# Patient Record
Sex: MALE | Race: WHITE | Employment: UNEMPLOYED | URBAN - NONMETROPOLITAN AREA
[De-identification: names, ages, dates, MRNs, and addresses within clinical notes are randomized per-mention and may not be internally consistent; named-entity substitution may affect disease eponyms.]

---

## 2020-09-29 ENCOUNTER — HOSPITAL ENCOUNTER (EMERGENCY)
Age: 50
Discharge: HOME | End: 2020-09-30
Attending: FAMILY MEDICINE
Payer: COMMERCIAL

## 2020-09-29 ENCOUNTER — APPOINTMENT (OUTPATIENT)
Dept: GENERAL RADIOLOGY | Age: 50
End: 2020-09-29
Payer: COMMERCIAL

## 2020-09-29 VITALS
DIASTOLIC BLOOD PRESSURE: 97 MMHG | HEART RATE: 89 BPM | TEMPERATURE: 99.4 F | WEIGHT: 315 LBS | SYSTOLIC BLOOD PRESSURE: 178 MMHG | OXYGEN SATURATION: 96 % | RESPIRATION RATE: 17 BRPM

## 2020-09-29 PROCEDURE — 99284 EMERGENCY DEPT VISIT MOD MDM: CPT

## 2020-09-29 PROCEDURE — 99283 EMERGENCY DEPT VISIT LOW MDM: CPT

## 2020-09-29 PROCEDURE — 71045 X-RAY EXAM CHEST 1 VIEW: CPT

## 2020-09-29 RX ORDER — LISINOPRIL 20 MG/1
20 TABLET ORAL 3 TIMES DAILY
COMMUNITY

## 2020-09-29 RX ORDER — TOPIRAMATE 100 MG/1
100 TABLET, FILM COATED ORAL 2 TIMES DAILY
COMMUNITY

## 2020-09-29 RX ORDER — LEVETIRACETAM 500 MG/1
500 TABLET ORAL 2 TIMES DAILY
COMMUNITY
End: 2022-05-27

## 2020-09-29 SDOH — HEALTH STABILITY: MENTAL HEALTH: HOW OFTEN DO YOU HAVE A DRINK CONTAINING ALCOHOL?: NEVER

## 2020-09-29 ASSESSMENT — PAIN DESCRIPTION - LOCATION: LOCATION: SHOULDER

## 2020-09-29 ASSESSMENT — PAIN DESCRIPTION - DESCRIPTORS: DESCRIPTORS: OTHER (COMMENT)

## 2020-09-29 ASSESSMENT — PAIN SCALES - GENERAL: PAINLEVEL_OUTOF10: 6

## 2020-09-29 ASSESSMENT — PAIN DESCRIPTION - ORIENTATION: ORIENTATION: RIGHT

## 2020-09-29 ASSESSMENT — PAIN DESCRIPTION - PAIN TYPE: TYPE: ACUTE PAIN

## 2020-09-29 ASSESSMENT — PAIN DESCRIPTION - FREQUENCY: FREQUENCY: INTERMITTENT

## 2020-09-30 PROCEDURE — 6370000000 HC RX 637 (ALT 250 FOR IP): Performed by: FAMILY MEDICINE

## 2020-09-30 RX ORDER — LISINOPRIL 10 MG/1
10 TABLET ORAL DAILY
Status: DISCONTINUED | OUTPATIENT
Start: 2020-09-30 | End: 2020-09-30 | Stop reason: HOSPADM

## 2020-09-30 RX ORDER — NAPROXEN 250 MG/1
500 TABLET ORAL ONCE
Status: COMPLETED | OUTPATIENT
Start: 2020-09-30 | End: 2020-09-30

## 2020-09-30 RX ADMIN — NAPROXEN 500 MG: 250 TABLET ORAL at 00:13

## 2020-09-30 RX ADMIN — LISINOPRIL 10 MG: 10 TABLET ORAL at 00:13

## 2020-09-30 ASSESSMENT — PAIN SCALES - GENERAL: PAINLEVEL_OUTOF10: 6

## 2020-09-30 NOTE — ED PROVIDER NOTES
eMERGENCY dEPARTMENT eNCOUnter        [address] [facility]    Chief Complaint   Patient presents with    Shoulder Pain     pt c/o right shoulder pain x 1 or 2 weeks. he states it hurts when his wife touches it. he also says his wife told him and she hears a \"tap tap tap\" when he breathes. HPI    Elizabeth Perez is a 52 y.o. male who presents with right shoulder pain for 2 weeks. He has trouble moving it overhead. There was no recent injury. When he breathes at times his wife hears a \"tap, tap, tap\". Ongoing for 1 week. He also has occasional cough. He has no hemoptysis or asthma. No medicine used for the breathing. No known COVID contacts. History of hypertension for which his lisinopril was recently increased in dosage. No recent seizure difficulty. REVIEW OF SYSTEMS    All body systems reviewed. Pertinent positive and significant findings mentioned in the HPI. Treated by PCP in [place]. Carmina Keith PAST MEDICAL HISTORY    Past Medical History:   Diagnosis Date    GERD (gastroesophageal reflux disease)     Hypertension     Seizures (HCC)        SURGICAL HISTORY    Past Surgical History:   Procedure Laterality Date    APPENDECTOMY      TONSILLECTOMY         CURRENT MEDICATIONS    Current Outpatient Rx   Medication Sig Dispense Refill    lisinopril (PRINIVIL;ZESTRIL) 20 MG tablet Take 20 mg by mouth 3 times daily      levETIRAcetam (KEPPRA) 500 MG tablet Take 500 mg by mouth 2 times daily      topiramate (TOPAMAX) 100 MG tablet Take 100 mg by mouth 2 times daily         ALLERGIES    Allergies   Allergen Reactions    Amoxicillin Rash    Penicillins Rash       FAMILY HISTORY    History reviewed. No pertinent family history.     SOCIAL HISTORY    Social History     Socioeconomic History    Marital status:      Spouse name: None    Number of children: None    Years of education: None    Highest education level: None   Occupational History    None   Social Needs    Financial resource strain: None    Food insecurity     Worry: None     Inability: None    Transportation needs     Medical: None     Non-medical: None   Tobacco Use    Smoking status: Current Every Day Smoker     Packs/day: 0.50     Types: Cigarettes    Smokeless tobacco: Never Used   Substance and Sexual Activity    Alcohol use: Never     Frequency: Never    Drug use: None    Sexual activity: None   Lifestyle    Physical activity     Days per week: None     Minutes per session: None    Stress: None   Relationships    Social connections     Talks on phone: None     Gets together: None     Attends [employer] orthodox service: None     Active member of club or organization: None     Attends meetings of clubs or organizations: None     Relationship status: None    Intimate partner violence     Fear of current or ex partner: None     Emotionally abused: None     Physically abused: None     Forced sexual activity: None   Other Topics Concern    None   Social History Narrative    None       PHYSICAL EXAM    VITAL SIGNS: BP (!) 178/97   Pulse 89   Temp 99.4 °F (37.4 °C) (Oral)   Resp 17   Wt (!) 318 lb (144.2 kg)   SpO2 96%    Constitutional:  Well developed, obese 53-year-old male with right shoulder pain and decreased motion, no acute distress   HENT:  Atraumatic, external ears normal, nose normal, oropharynx moist. Neck- supple   Respiratory: No breathing difficulty. Some scattered rhonchi. No distinct wheezing. Occasional nonproductive cough. Rib movement is symmetrical.  Cardiovascular: Regular rate and rhythm with PMI on the left. Appears to be in the midclavicular line. No distinct murmur. GI:  Soft, obese, active bowel sounds, nontender, no guarding   Musculoskeletal: Right shoulder pain with abduction and external rotation to 90 degrees. No gross deformity.   Back- no tenderness   Integument:  Well hydrated, no rash of the right shoulder, arm or chest.  Neurologic:  Alert & oriented 53-year-old male who answers questions appropriately. Motor function is overall stable. Some decreased range of motion with the right shoulder due to pain. Sensation intact to light touch over general body dermatomes.  strength equal bilaterally. RADIOLOGY/PROCEDURES    Portable chest x-ray shows bronchitis pattern. ED COURSE & MEDICAL DECISION MAKING    Pertinent Labs & Imaging studies reviewed. (See chart for details)    Summation      Patient Course: 57-year-old male evaluated for shoulder and breathing concerns. Appears to have right shoulder bursitis. No gross deformity. Treat with anti-inflammatory and range of motion. With elevated blood pressure lisinopril was given. Findings discussed with patient especially bursitis and bronchitis. Robitussin-DM recommended. ED Medications administered this visit:    Medications   lisinopril (PRINIVIL;ZESTRIL) tablet 10 mg (10 mg Oral Given 9/30/20 0013)   naproxen (NAPROSYN) tablet 500 mg (500 mg Oral Given 9/30/20 0013)       New Prescriptions from this visit:    Discharge Medication List as of 9/30/2020 12:34 AM          Follow-up:  Abraham Gonzales MD  [address]  [phone]    Schedule an appointment as soon as possible for a visit in 1 week  If symptoms worsen        Final Impression:   1. Bursitis of right shoulder    2.  Bronchitis               (Please note that portions of this note were completed with a voice recognition program.  Efforts were made to edit the dictations but occasionally words are mis-transcribed.)          Debi Estevez DO  09/30/20 [id number]